# Patient Record
Sex: FEMALE | Race: WHITE | Employment: OTHER | URBAN - METROPOLITAN AREA
[De-identification: names, ages, dates, MRNs, and addresses within clinical notes are randomized per-mention and may not be internally consistent; named-entity substitution may affect disease eponyms.]

---

## 2019-05-16 ENCOUNTER — HOSPITAL ENCOUNTER (OUTPATIENT)
Dept: MRI IMAGING | Age: 64
Discharge: HOME | End: 2019-05-16
Attending: FAMILY MEDICINE
Payer: COMMERCIAL

## 2019-05-16 DIAGNOSIS — E22.1 HYPERPROLACTINEMIA (HCC): ICD-10-CM

## 2019-05-16 PROCEDURE — 74011250636 HC RX REV CODE- 250/636

## 2019-05-16 PROCEDURE — A9585 GADOBUTROL INJECTION: HCPCS

## 2019-05-16 PROCEDURE — 70553 MRI BRAIN STEM W/O & W/DYE: CPT

## 2019-05-16 RX ADMIN — GADOBUTROL 8 ML: 604.72 INJECTION INTRAVENOUS at 19:28

## 2021-07-02 ENCOUNTER — TRANSCRIBE ORDER (OUTPATIENT)
Dept: SCHEDULING | Age: 66
End: 2021-07-02

## 2021-07-02 DIAGNOSIS — R22.1 LOCALIZED SWELLING, MASS AND LUMP, NECK: Primary | ICD-10-CM

## 2021-07-21 ENCOUNTER — HOSPITAL ENCOUNTER (OUTPATIENT)
Dept: ULTRASOUND IMAGING | Age: 66
Discharge: HOME | End: 2021-07-21
Attending: FAMILY MEDICINE
Payer: COMMERCIAL

## 2021-07-21 DIAGNOSIS — R22.1 LOCALIZED SWELLING, MASS AND LUMP, NECK: ICD-10-CM

## 2021-07-21 PROCEDURE — 76536 US EXAM OF HEAD AND NECK: CPT

## 2023-02-15 ENCOUNTER — TRANSCRIBE ORDER (OUTPATIENT)
Dept: SCHEDULING | Age: 68
End: 2023-02-15

## 2023-02-15 DIAGNOSIS — D35.2 BENIGN NEOPLASM OF PITUITARY GLAND (HCC): Primary | ICD-10-CM

## 2023-02-24 ENCOUNTER — HOSPITAL ENCOUNTER (OUTPATIENT)
Dept: MRI IMAGING | Age: 68
Discharge: HOME | End: 2023-02-24
Attending: INTERNAL MEDICINE
Payer: COMMERCIAL

## 2023-02-24 VITALS — WEIGHT: 175 LBS | BODY MASS INDEX: 25.11 KG/M2

## 2023-02-24 DIAGNOSIS — D35.2 BENIGN NEOPLASM OF PITUITARY GLAND (HCC): ICD-10-CM

## 2023-02-24 PROCEDURE — A9576 INJ PROHANCE MULTIPACK: HCPCS | Performed by: RADIOLOGY

## 2023-02-24 PROCEDURE — 70553 MRI BRAIN STEM W/O & W/DYE: CPT

## 2023-02-24 PROCEDURE — 74011250636 HC RX REV CODE- 250/636: Performed by: RADIOLOGY

## 2023-02-24 RX ADMIN — GADOTERIDOL 15 ML: 279.3 INJECTION, SOLUTION INTRAVENOUS at 13:18

## 2024-08-20 ENCOUNTER — TRANSCRIBE ORDERS (OUTPATIENT)
Facility: HOSPITAL | Age: 69
End: 2024-08-20

## 2024-08-20 DIAGNOSIS — Z12.31 VISIT FOR SCREENING MAMMOGRAM: Primary | ICD-10-CM

## 2024-08-29 ENCOUNTER — HOSPITAL ENCOUNTER (OUTPATIENT)
Facility: HOSPITAL | Age: 69
Discharge: HOME | End: 2024-08-29
Attending: INTERNAL MEDICINE
Payer: COMMERCIAL

## 2024-08-29 ENCOUNTER — HOSPITAL ENCOUNTER (OUTPATIENT)
Facility: HOSPITAL | Age: 69
End: 2024-08-29
Attending: INTERNAL MEDICINE
Payer: COMMERCIAL

## 2024-08-29 VITALS — BODY MASS INDEX: 23.85 KG/M2 | HEIGHT: 69 IN | WEIGHT: 161 LBS

## 2024-08-29 DIAGNOSIS — M81.0 AGE-RELATED OSTEOPOROSIS WITHOUT CURRENT PATHOLOGICAL FRACTURE: ICD-10-CM

## 2024-08-29 DIAGNOSIS — Z12.31 VISIT FOR SCREENING MAMMOGRAM: ICD-10-CM

## 2024-08-29 PROCEDURE — 77063 BREAST TOMOSYNTHESIS BI: CPT

## 2024-08-29 PROCEDURE — 77067 SCR MAMMO BI INCL CAD: CPT

## 2024-08-29 PROCEDURE — 77080 DXA BONE DENSITY AXIAL: CPT

## 2024-09-04 ENCOUNTER — CLINICAL DOCUMENTATION (OUTPATIENT)
Facility: HOSPITAL | Age: 69
End: 2024-09-04

## 2024-09-04 NOTE — PROGRESS NOTES
We have tried contacting the patient to get her scheduled for her diagnostic mammo and ultrasound. No luck reaching the patient.

## 2024-09-24 ENCOUNTER — HOSPITAL ENCOUNTER (OUTPATIENT)
Facility: HOSPITAL | Age: 69
Discharge: HOME | End: 2024-09-27
Payer: COMMERCIAL

## 2024-09-24 VITALS — BODY MASS INDEX: 23.85 KG/M2 | HEIGHT: 69 IN | WEIGHT: 161 LBS

## 2024-09-24 DIAGNOSIS — R92.8 ABNORMAL MAMMOGRAM OF LEFT BREAST: ICD-10-CM

## 2024-09-24 PROCEDURE — G0279 TOMOSYNTHESIS, MAMMO: HCPCS

## 2025-05-21 ENCOUNTER — TRANSCRIBE ORDERS (OUTPATIENT)
Facility: HOSPITAL | Age: 70
End: 2025-05-21

## 2025-05-21 DIAGNOSIS — D35.2 BENIGN NEOPLASM OF PITUITARY GLAND (HCC): Primary | ICD-10-CM

## 2025-06-11 ENCOUNTER — HOSPITAL ENCOUNTER (OUTPATIENT)
Facility: HOSPITAL | Age: 70
Discharge: HOME | End: 2025-06-14
Attending: INTERNAL MEDICINE
Payer: COMMERCIAL

## 2025-06-11 VITALS — WEIGHT: 165 LBS | BODY MASS INDEX: 24.23 KG/M2

## 2025-06-11 DIAGNOSIS — D35.2 BENIGN NEOPLASM OF PITUITARY GLAND (HCC): ICD-10-CM

## 2025-06-11 PROCEDURE — 6360000004 HC RX CONTRAST MEDICATION: Performed by: RADIOLOGY

## 2025-06-11 PROCEDURE — A9579 GAD-BASE MR CONTRAST NOS,1ML: HCPCS | Performed by: RADIOLOGY

## 2025-06-11 PROCEDURE — 70553 MRI BRAIN STEM W/O & W/DYE: CPT | Performed by: INTERNAL MEDICINE

## 2025-06-11 RX ORDER — GADOTERIDOL 279.3 MG/ML
14 INJECTION INTRAVENOUS
Status: COMPLETED | OUTPATIENT
Start: 2025-06-11 | End: 2025-06-11

## 2025-06-11 RX ADMIN — GADOTERIDOL 14 ML: 279.3 INJECTION, SOLUTION INTRAVENOUS at 09:14

## 2025-07-09 ENCOUNTER — TRANSCRIBE ORDERS (OUTPATIENT)
Facility: HOSPITAL | Age: 70
End: 2025-07-09

## 2025-07-09 DIAGNOSIS — I63.9 IMPENDING CEREBROVASCULAR ACCIDENT (HCC): Primary | ICD-10-CM

## 2025-07-24 ENCOUNTER — HOSPITAL ENCOUNTER (OUTPATIENT)
Facility: HOSPITAL | Age: 70
Discharge: HOME | End: 2025-07-26
Attending: INTERNAL MEDICINE
Payer: COMMERCIAL

## 2025-07-24 VITALS
BODY MASS INDEX: 24.44 KG/M2 | SYSTOLIC BLOOD PRESSURE: 149 MMHG | HEIGHT: 69 IN | DIASTOLIC BLOOD PRESSURE: 86 MMHG | WEIGHT: 165 LBS

## 2025-07-24 DIAGNOSIS — I63.9 IMPENDING CEREBROVASCULAR ACCIDENT (HCC): ICD-10-CM

## 2025-07-24 LAB
ECHO AO ARCH DIAM: 2.2 CM
ECHO AO ASC DIAM: 3.4 CM
ECHO AO ASCENDING AORTA INDEX: 1.78 CM/M2
ECHO AO ROOT DIAM: 3.2 CM
ECHO AO ROOT INDEX: 1.68 CM/M2
ECHO AR MAX VEL PISA: 2.8 M/S
ECHO AV AREA PEAK VELOCITY: 1.6 CM2
ECHO AV AREA PEAK VELOCITY: 1.8 CM2
ECHO AV AREA VTI: 1.8 CM2
ECHO AV AREA/BSA VTI: 0.9 CM2/M2
ECHO AV MEAN GRADIENT: 4 MMHG
ECHO AV MEAN VELOCITY: 0.9 M/S
ECHO AV PEAK GRADIENT: 7 MMHG
ECHO AV PEAK GRADIENT: 9 MMHG
ECHO AV PEAK VELOCITY: 1.3 M/S
ECHO AV PEAK VELOCITY: 1.5 M/S
ECHO AV REGURGITANT PHT: 340.4 MS
ECHO AV VTI: 30.5 CM
ECHO BSA: 1.91 M2
ECHO EST RA PRESSURE: 3 MMHG
ECHO LA DIAMETER INDEX: 1.73 CM/M2
ECHO LA DIAMETER: 3.3 CM
ECHO LA TO AORTIC ROOT RATIO: 1.03
ECHO LA VOL A-L A2C: 28 ML (ref 22–52)
ECHO LA VOL A-L A4C: 22 ML (ref 22–52)
ECHO LA VOL BP: 25 ML (ref 22–52)
ECHO LA VOL MOD A2C: 27 ML (ref 22–52)
ECHO LA VOL MOD A4C: 19 ML (ref 22–52)
ECHO LA VOL/BSA BIPLANE: 13 ML/M2 (ref 16–34)
ECHO LA VOLUME AREA LENGTH: 27 ML
ECHO LA VOLUME INDEX A-L A2C: 15 ML/M2 (ref 16–34)
ECHO LA VOLUME INDEX A-L A4C: 12 ML/M2 (ref 16–34)
ECHO LA VOLUME INDEX AREA LENGTH: 14 ML/M2 (ref 16–34)
ECHO LA VOLUME INDEX MOD A2C: 14 ML/M2 (ref 16–34)
ECHO LA VOLUME INDEX MOD A4C: 10 ML/M2 (ref 16–34)
ECHO LV E' LATERAL VELOCITY: 9.35 CM/S
ECHO LV E' SEPTAL VELOCITY: 6.3 CM/S
ECHO LV EDV A2C: 69 ML
ECHO LV EDV A4C: 87 ML
ECHO LV EDV BP: 78 ML (ref 56–104)
ECHO LV EDV INDEX A4C: 46 ML/M2
ECHO LV EDV INDEX BP: 41 ML/M2
ECHO LV EDV NDEX A2C: 36 ML/M2
ECHO LV EF PHYSICIAN: 57 %
ECHO LV EJECTION FRACTION A2C: 60 %
ECHO LV EJECTION FRACTION A4C: 55 %
ECHO LV EJECTION FRACTION BIPLANE: 57 % (ref 55–100)
ECHO LV ESV A2C: 28 ML
ECHO LV ESV A4C: 39 ML
ECHO LV ESV BP: 34 ML (ref 19–49)
ECHO LV ESV INDEX A2C: 15 ML/M2
ECHO LV ESV INDEX A4C: 20 ML/M2
ECHO LV ESV INDEX BP: 18 ML/M2
ECHO LV FRACTIONAL SHORTENING: 30 % (ref 28–44)
ECHO LV INTERNAL DIMENSION DIASTOLE INDEX: 2.41 CM/M2
ECHO LV INTERNAL DIMENSION DIASTOLIC: 4.6 CM (ref 3.9–5.3)
ECHO LV INTERNAL DIMENSION SYSTOLIC INDEX: 1.68 CM/M2
ECHO LV INTERNAL DIMENSION SYSTOLIC: 3.2 CM
ECHO LV IVSD: 0.7 CM (ref 0.6–0.9)
ECHO LV MASS 2D: 99.3 G (ref 67–162)
ECHO LV MASS INDEX 2D: 52 G/M2 (ref 43–95)
ECHO LV POSTERIOR WALL DIASTOLIC: 0.7 CM (ref 0.6–0.9)
ECHO LV RELATIVE WALL THICKNESS RATIO: 0.3
ECHO LVOT AREA: 3.1 CM2
ECHO LVOT AV VTI INDEX: 0.54
ECHO LVOT DIAM: 2 CM
ECHO LVOT MEAN GRADIENT: 1 MMHG
ECHO LVOT PEAK GRADIENT: 2 MMHG
ECHO LVOT PEAK VELOCITY: 0.7 M/S
ECHO LVOT STROKE VOLUME INDEX: 27.1 ML/M2
ECHO LVOT SV: 51.8 ML
ECHO LVOT VTI: 16.5 CM
ECHO MV A VELOCITY: 0.65 M/S
ECHO MV E DECELERATION TIME (DT): 247.1 MS
ECHO MV E VELOCITY: 0.48 M/S
ECHO MV E/A RATIO: 0.74
ECHO MV E/E' LATERAL: 5.13
ECHO MV E/E' RATIO (AVERAGED): 6.38
ECHO MV E/E' SEPTAL: 7.62
ECHO MV REGURGITANT PEAK GRADIENT: 74 MMHG
ECHO MV REGURGITANT PEAK VELOCITY: 4.3 M/S
ECHO PULMONARY ARTERY END DIASTOLIC PRESSURE: 2 MMHG
ECHO PV MAX VELOCITY: 1.4 M/S
ECHO PV PEAK GRADIENT: 8 MMHG
ECHO PV REGURGITANT MAX VELOCITY: 0.8 M/S
ECHO RIGHT VENTRICULAR SYSTOLIC PRESSURE (RVSP): 29 MMHG
ECHO RV FREE WALL PEAK S': 10.8 CM/S
ECHO RV INTERNAL DIMENSION: 3.6 CM
ECHO RV TAPSE: 2 CM (ref 1.7–?)
ECHO TV REGURGITANT MAX VELOCITY: 2.54 M/S
ECHO TV REGURGITANT PEAK GRADIENT: 26 MMHG

## 2025-07-24 PROCEDURE — 93306 TTE W/DOPPLER COMPLETE: CPT
